# Patient Record
Sex: FEMALE | Race: BLACK OR AFRICAN AMERICAN | NOT HISPANIC OR LATINO | URBAN - METROPOLITAN AREA
[De-identification: names, ages, dates, MRNs, and addresses within clinical notes are randomized per-mention and may not be internally consistent; named-entity substitution may affect disease eponyms.]

---

## 2021-03-23 ENCOUNTER — EMERGENCY (EMERGENCY)
Facility: HOSPITAL | Age: 36
LOS: 0 days | Discharge: HOME | End: 2021-03-23
Attending: EMERGENCY MEDICINE
Payer: COMMERCIAL

## 2021-03-23 VITALS
WEIGHT: 169.76 LBS | RESPIRATION RATE: 17 BRPM | HEART RATE: 88 BPM | DIASTOLIC BLOOD PRESSURE: 94 MMHG | HEIGHT: 65 IN | TEMPERATURE: 99 F | SYSTOLIC BLOOD PRESSURE: 157 MMHG | OXYGEN SATURATION: 100 %

## 2021-03-23 VITALS
RESPIRATION RATE: 18 BRPM | OXYGEN SATURATION: 100 % | TEMPERATURE: 99 F | SYSTOLIC BLOOD PRESSURE: 132 MMHG | DIASTOLIC BLOOD PRESSURE: 66 MMHG | HEART RATE: 85 BPM

## 2021-03-23 PROCEDURE — 99284 EMERGENCY DEPT VISIT MOD MDM: CPT

## 2021-03-23 RX ORDER — DIPHENHYDRAMINE HCL 50 MG
50 CAPSULE ORAL ONCE
Refills: 0 | Status: COMPLETED | OUTPATIENT
Start: 2021-03-23 | End: 2021-03-23

## 2021-03-23 RX ORDER — DIPHENHYDRAMINE HCL 50 MG
2 CAPSULE ORAL
Qty: 12 | Refills: 0
Start: 2021-03-23 | End: 2021-03-24

## 2021-03-23 RX ADMIN — Medication 50 MILLIGRAM(S): at 05:21

## 2021-03-23 RX ADMIN — Medication 50 MILLIGRAM(S): at 05:20

## 2021-03-23 NOTE — ED ADULT NURSE REASSESSMENT NOTE - NS ED NURSE REASSESS COMMENT FT1
Pt states that she feels "ball" in her throat went down but only feels a little tightness in throat. Denies SOB, difficulty breathing or feeling of throat closing up. PO meds tolerated well.

## 2021-03-23 NOTE — ED ADULT NURSE NOTE - OBJECTIVE STATEMENT
Pt axox3 presents to the ED complaining of allergic reaction. pt states that she does not know what could have triggered reaction but states that her throat feels itchy and it feels if she has ball in her throat. Hx angioedema. Denies chest pain, SOB, difficulty swallowing. Pt lips swollen noted. Pt states that she was eating [employer]: chicken, yams, nothing out of ordinary that she usually eats. Pt states that she had asthma panel done, no allergies were shown. Pt states possibility of pregnancy.

## 2021-03-23 NOTE — ED PROVIDER NOTE - OBJECTIVE STATEMENT
Pt is a 36 yo lady with no significant past medical history who presents to the ED with allergic reaction. She was watching tv at 3 AM and was eating [employer] and started feeling itching on her lip and her L. arm. No hives. No sob, no n/v/d. Has had hx of this in the past, not associated with this food.

## 2021-03-23 NOTE — ED PROVIDER NOTE - PATIENT PORTAL LINK FT
You can access the FollowMyHealth Patient Portal offered by [facility] by registering at the following website: [email or web address]. By joining [employer]’s FollowMyHealth portal, you will also be able to view your health information using other applications (apps) compatible with our system.

## 2021-03-23 NOTE — ED PROVIDER NOTE - CLINICAL SUMMARY MEDICAL DECISION MAKING FREE TEXT BOX
Ddx: Allergic reaction, mild/ slight angioedema potentially, but no airway compromise  Plan: benadryl, steroid, pt declines pepcid, observe, and reassess
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
## 2021-03-26 DIAGNOSIS — T78.40XA ALLERGY, UNSPECIFIED, INITIAL ENCOUNTER: ICD-10-CM

## 2021-03-26 DIAGNOSIS — L29.9 PRURITUS, UNSPECIFIED: ICD-10-CM

## 2021-06-05 ENCOUNTER — EMERGENCY (EMERGENCY)
Facility: HOSPITAL | Age: 36
LOS: 0 days | Discharge: HOME | End: 2021-06-05
Attending: STUDENT IN AN ORGANIZED HEALTH CARE EDUCATION/TRAINING PROGRAM
Payer: COMMERCIAL

## 2021-06-05 VITALS
HEART RATE: 76 BPM | DIASTOLIC BLOOD PRESSURE: 78 MMHG | TEMPERATURE: 98 F | RESPIRATION RATE: 20 BRPM | WEIGHT: 164.91 LBS | OXYGEN SATURATION: 98 % | HEIGHT: 65 IN | SYSTOLIC BLOOD PRESSURE: 147 MMHG

## 2021-06-05 VITALS
SYSTOLIC BLOOD PRESSURE: 118 MMHG | OXYGEN SATURATION: 99 % | DIASTOLIC BLOOD PRESSURE: 82 MMHG | HEART RATE: 82 BPM | RESPIRATION RATE: 18 BRPM | TEMPERATURE: 98 F

## 2021-06-05 DIAGNOSIS — R60.9 EDEMA, UNSPECIFIED: ICD-10-CM

## 2021-06-05 DIAGNOSIS — Z00.00 ENCOUNTER FOR GENERAL ADULT MEDICAL EXAMINATION WITHOUT ABNORMAL FINDINGS: ICD-10-CM

## 2021-06-05 PROCEDURE — 99283 EMERGENCY DEPT VISIT LOW MDM: CPT

## 2021-06-05 RX ADMIN — Medication 40 MILLIGRAM(S): at 20:06

## 2021-06-05 NOTE — ED ADULT NURSE NOTE - OBJECTIVE STATEMENT
Patient received at bed 12. Patient A&Ox4. Patient reports that she took a nap this afternoon and when she woke up, she noticed left sided facial swelling with neck and behind the ear pain. Patient reports having a toothache yesterday on the top left molar. Patient reports chest tightness and stuffy nose. Patient denies sick contacts. Patient denies headache or dizziness. Patient reports a history of angioedema issues in the past with no specific allergy to anything. Patient denies PMH and taking medications daily.

## 2021-06-05 NOTE — ED PROVIDER NOTE - OBJECTIVE STATEMENT
pt denies medical hx, states woke up w/ L sided facial swelling, not sure from what, has no documented allergies but does have history of (seeming) allergic reactions, has been to allergist without resolve. Denies sob, chest pain, difficulty swallowing, rashes, fevers.

## 2021-06-05 NOTE — ED ADULT TRIAGE NOTE - CHIEF COMPLAINT QUOTE
Nausea, dizzy, left side of facial area swollen upon awakening today, now has right side of face swollen as per patient

## 2021-06-05 NOTE — ED PROVIDER NOTE - PATIENT PORTAL LINK FT
You can access the FollowMyHealth Patient Portal offered by [facility] by registering at the following website: [email or web address]. By joining [employer]’s FollowMyHealth portal, you will also be able to view your health information using other applications (apps) compatible with our system.

## 2021-06-05 NOTE — ED PROVIDER NOTE - CLINICAL SUMMARY MEDICAL DECISION MAKING FREE TEXT BOX
well appearing pt w/ very mild swelling to L mandibular area, non tender, no airway involvement, pt tolerating secretions. Possible allergic etiology, possibly mild paratitis, no infectious component, no drainage. Benadryl pta, will send prednisone to pharmacy.

## 2021-07-10 ENCOUNTER — EMERGENCY (EMERGENCY)
Facility: HOSPITAL | Age: 36
LOS: 0 days | Discharge: HOME | End: 2021-07-11
Attending: STUDENT IN AN ORGANIZED HEALTH CARE EDUCATION/TRAINING PROGRAM
Payer: COMMERCIAL

## 2021-07-10 VITALS
DIASTOLIC BLOOD PRESSURE: 89 MMHG | HEIGHT: 65 IN | SYSTOLIC BLOOD PRESSURE: 147 MMHG | OXYGEN SATURATION: 99 % | RESPIRATION RATE: 19 BRPM | HEART RATE: 84 BPM | WEIGHT: 169.98 LBS | TEMPERATURE: 98 F

## 2021-07-10 DIAGNOSIS — T78.40XA ALLERGY, UNSPECIFIED, INITIAL ENCOUNTER: ICD-10-CM
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
## 2021-07-10 DIAGNOSIS — Z88.1 ALLERGY STATUS TO OTHER ANTIBIOTIC AGENTS: ICD-10-CM

## 2021-07-10 DIAGNOSIS — R22.0 LOCALIZED SWELLING, MASS AND LUMP, HEAD: ICD-10-CM

## 2021-07-10 PROCEDURE — 99284 EMERGENCY DEPT VISIT MOD MDM: CPT

## 2021-07-10 RX ORDER — DIPHENHYDRAMINE HCL 50 MG
25 CAPSULE ORAL ONCE
Refills: 0 | Status: COMPLETED | OUTPATIENT
Start: 2021-07-10 | End: 2021-07-10

## 2021-07-10 RX ORDER — FAMOTIDINE 10 MG/ML
20 INJECTION INTRAVENOUS ONCE
Refills: 0 | Status: COMPLETED | OUTPATIENT
Start: 2021-07-10 | End: 2021-07-10

## 2021-07-10 RX ADMIN — Medication 40 MILLIGRAM(S): at 23:42

## 2021-07-10 RX ADMIN — Medication 25 MILLIGRAM(S): at 23:42

## 2021-07-10 NOTE — ED PROVIDER NOTE - NSFOLLOWUPCLINICS_GEN_ALL_ED_FT
[facility]  Allergy  [address] [place] [address]  Phone: [phone]  Fax:

## 2021-07-10 NOTE — ED ADULT NURSE NOTE - OBJECTIVE STATEMENT
Pt c/o itchiness to mouth, throat and ear, feels like is swollen and difficulty swallowing . pt states she ate some sour cream chips x 1 hr. Denies any respiratory distress. + L- facial swelling,

## 2021-07-10 NOTE — ED PROVIDER NOTE - PATIENT PORTAL LINK FT
You can access the FollowMyHealth Patient Portal offered by [facility] by registering at the following website: [email or web address]. By joining [employer]’s FollowMyHealth portal, you will also be able to view your health information using other applications (apps) compatible with our system.

## 2021-07-10 NOTE — ED PROVIDER NOTE - NSFOLLOWUPINSTRUCTIONS_ED_ALL_ED_FT
1) Please follow-up at the enclosed clinic about your symptoms.  If you cannot follow-up with your doctor(s), please return to the ED for any urgent issues.  2) If you have any worsening of symptoms, including chest pain, difficulty breathing, sensation that you may pass out, or any other concerns please return to the ED immediately.  3) Please continue taking your home medications as directed.  4) You may have been given a copy of your labs and/or imaging.  Please go over these with your primary care doctor.   5) A prescription has been sent to your pharmacy. Please take it as directed.       Allergic Reaction    An allergic reaction is an abnormal reaction to a substance (allergen) by the body's defense system. Common allergens include medicines, food, insect bites or stings, and blood products. The body releases certain proteins into the blood that can cause a variety of symptoms such as an itchy rash, wheezing, swelling of the face/lips/tongue/throat, abdominal pain, nausea or vomiting. An allergic reaction is usually treated with medication. If your health care provider prescribed you an epinephrine injection device, make sure to keep it with you at all times.    SEEK IMMEDIATE MEDICAL CARE IF YOU HAVE ANY OF THE FOLLOWING SYMPTOMS: allergic reaction severe enough that required you to use epinephrine, tightness in your chest, swelling around your lips/tongue/throat, abdominal pain, vomiting or diarrhea, or lightheadedness/dizziness. These symptoms may represent a serious problem that is an emergency. Do not wait to see if the symptoms will go away. Use your auto-injector pen or anaphylaxis kit as you have been instructed. Call 911 and do not drive yourself to the hospital.

## 2021-07-10 NOTE — ED PROVIDER NOTE - CLINICAL SUMMARY MEDICAL DECISION MAKING FREE TEXT BOX
no oropharyngeal edema or wheezing appreciated on exam. tolerating secretions. normal vitals. mild allergic rxn, plan meds, observation, likely dc to outpt allergy clinic w/ steroid course.

## 2021-07-10 NOTE — ED ADULT TRIAGE NOTE - CHIEF COMPLAINT QUOTE
Pt c/o itchiness to mouth, throat and ear, feels like is swollen and difficulty swallowing . pt states she ate some sour cream chips x 1 hr. Denies any respiratory distress

## 2021-07-10 NOTE — ED PROVIDER NOTE - OBJECTIVE STATEMENT
35F remote hx of angioedema presents to ED for symptoms concerning for allergic reaction. States that she ate some sour cream chips this afternoon and ever since has experienced some facial swelling and a fullness sensation in her throat. Finally decided to call an uber to the ED. Denies hx of allergies to foods in past. Denies prior intubations. Took no meds prior to coming to the ED. Denies cp, ab pain, nausea/vomiting, difficulty breathing.

## 2021-07-11 VITALS
SYSTOLIC BLOOD PRESSURE: 125 MMHG | DIASTOLIC BLOOD PRESSURE: 85 MMHG | TEMPERATURE: 98 F | RESPIRATION RATE: 18 BRPM | OXYGEN SATURATION: 100 % | HEART RATE: 68 BPM

## 2021-07-11 NOTE — ED ADULT NURSE REASSESSMENT NOTE - NS ED NURSE REASSESS COMMENT FT1
patient states that she does not feel ready to go at this time, education given to patient by this nurse and Dr. Herron regarding allergic reaction

## 2022-01-01 NOTE — ED PROVIDER NOTE - NEURO NEGATIVE STATEMENT, MLM
no loss of consciousness, no gait abnormality, no headache, no sensory deficits, and no weakness.
Statement Selected

## 2022-01-25 ENCOUNTER — EMERGENCY (EMERGENCY)
Facility: HOSPITAL | Age: 37
LOS: 0 days | Discharge: HOME | End: 2022-01-26
Attending: STUDENT IN AN ORGANIZED HEALTH CARE EDUCATION/TRAINING PROGRAM
Payer: COMMERCIAL

## 2022-01-25 VITALS
HEART RATE: 79 BPM | HEIGHT: 65 IN | TEMPERATURE: 98 F | RESPIRATION RATE: 16 BRPM | DIASTOLIC BLOOD PRESSURE: 94 MMHG | SYSTOLIC BLOOD PRESSURE: 137 MMHG | OXYGEN SATURATION: 99 % | WEIGHT: 175.05 LBS

## 2022-01-25 DIAGNOSIS — J02.9 ACUTE PHARYNGITIS, UNSPECIFIED: ICD-10-CM

## 2022-01-25 DIAGNOSIS — Z88.1 ALLERGY STATUS TO OTHER ANTIBIOTIC AGENTS: ICD-10-CM

## 2022-01-25 DIAGNOSIS — U07.1 COVID-19: ICD-10-CM

## 2022-01-25 PROCEDURE — 99284 EMERGENCY DEPT VISIT MOD MDM: CPT

## 2022-01-26 VITALS
HEART RATE: 77 BPM | TEMPERATURE: 98 F | OXYGEN SATURATION: 98 % | SYSTOLIC BLOOD PRESSURE: 129 MMHG | RESPIRATION RATE: 18 BRPM | DIASTOLIC BLOOD PRESSURE: 88 MMHG

## 2022-01-26 LAB
FLUAV AG NPH QL: SIGNIFICANT CHANGE UP
FLUBV AG NPH QL: SIGNIFICANT CHANGE UP
HCG UR QL: NEGATIVE — SIGNIFICANT CHANGE UP
SARS-COV-2 RNA SPEC QL NAA+PROBE: DETECTED

## 2022-01-26 RX ORDER — DIPHENHYDRAMINE HCL 50 MG
50 CAPSULE ORAL ONCE
Refills: 0 | Status: COMPLETED | OUTPATIENT
Start: 2022-01-26 | End: 2022-01-26

## 2022-01-26 RX ADMIN — Medication 40 MILLIGRAM(S): at 03:14

## 2022-01-26 RX ADMIN — Medication 50 MILLIGRAM(S): at 03:14

## 2022-01-26 NOTE — ED PROVIDER NOTE - CLINICAL SUMMARY MEDICAL DECISION MAKING FREE TEXT BOX
Pt p/w with sore throat for XXX days. No history of immunocompromise. Non-toxic appearing and hemodynamically stable in ED. Patient is euvolemic w/ no trismus, no drooling, no muffled voice, no dysphagia, no odynophagia, no airway compromise, no neck mass, no swelling at submandibular area, no lip swelling, no tongue swelling, no cyanosis, no raised tongue, no "woody" or brawny texture to floor of mouth, no erythema to floor of mouth. Able to tolerate PO intake and secretions.   Considered DDX but not consistent with presentation: Zaire's angina, Streptococcal pharyngitis, Bacterial Tracheitis, infectious mononucleosis, Angioedema, retropharyngeal abscess, peritonsillar abscess, epiglottitis, penetrating traumatic injury, parotitits, acute necrotizing ulcerative gingivitis, caustic ingestion.  PLAN:  - conservative treatment, PCP followup and return precautions  - prednisone

## 2022-01-26 NOTE — ED ADULT NURSE NOTE - OBJECTIVE STATEMENT
Pt AOx4, ambulatory, c/o itchy throat x 3 hours PTA. PT denies fever, SOB, CP, difficulty swallowing or breathing.

## 2022-01-26 NOTE — ED PROVIDER NOTE - NSFOLLOWUPINSTRUCTIONS_ED_ALL_ED_FT
Rest, drink plenty of fluids.  Advance activity as tolerated.  Continue all previously prescribed medications as directed.  Follow up with your PMD 2-3 days and bring copies of your results.  Return to the ER for worsening symptoms, fevers, vomiting, unable to drink or eat or new concerning symptoms.    Take acetaminophen 650 mg orally every 6-8 hours for pain control as needed. Please do not exceed 4,000 mg of acetaminophen during a 24 hours period. Acetaminophen can be found in many over-the-counter cold medications as well as opioid medications that may be given for pain.    Take ibuprofen (also known as MOTRIN or ADVIL) 400 mg orally every 6-8 hours for pain control as needed with food to avoid an upset stomach. Ibuprofen can be found in many over-the-counter medications. Please do not take ibuprofen if you have a bleeding disorder, stomach or gastrointestinal ulcer, or liver disease.    If needed, you can alternate these medications so that you can take one medication every 3 hours. For example, at noon take ibuprofen, then at 3PM take acetaminophen, then at 6PM take ibuprofen.

## 2022-01-26 NOTE — ED PROVIDER NOTE - OBJECTIVE STATEMENT
36F presenting with sore throat x 3 hours, feels that something is swollen. Was drinking alcohol overnight. A/w mild hoarse throat. Denies any change in voice, difficulty swallowing, fevers, chills, headaches, neck pain, muffled voice, rash, chest pain, shortness of breath, abdominal pain.

## 2022-01-26 NOTE — ED PROVIDER NOTE - PHYSICAL EXAMINATION
VITAL SIGNS: I have reviewed nursing notes and confirm.   GEN: Well-developed; well-nourished; in no acute distress. Speaking full sentences.  SKIN: Warm, pink, no rash, no diaphoresis, no cyanosis, well perfused.   HEAD: Normocephalic; atraumatic. No scalp lacerations, no abrasions.  NECK: Supple; non tender.   EYES: Pupils 3mm equal, round, reactive to light and accomodation, conjunctiva and sclera clear. Extra-ocular movements intact bilaterally.  ENT: No nasal discharge; airway clear. Trachea is midline. ORAL: No oropharyngeal exudates or erythema. Normal dentition.  CV: Regular rate and rhythm. S1, S2 normal; no murmurs, gallops, or rubs. No lower extremity pitting edema bilaterally. Capillary refill < 2 seconds throughout. Distal pulses intact 2+ throughout.  RESP: CTA bilaterally. No wheezes, rales, or rhonchi.   ABD: Normal bowel sounds, soft, non-distended, non-tender, no rebound, no guarding, no rigidity, no hepatosplenomegaly. No CVA tenderness bilaterally.  MSK: Normal range of motion and movement of all 4 extremities. No joint or muscular pain throughout. No clubbing.   BACK: No thoracolumbar midline or paravertebral tenderness. No step-offs or obvious deformities.  NEURO: Alert & oriented x 3, Grossly unremarkable. Sensory and motor intact throughout. No focal deficits. Gait: Fluid. Normal speech and coordination.   PSYCH: Cooperative, appropriate.
No

## 2022-01-26 NOTE — ED PROVIDER NOTE - PATIENT PORTAL LINK FT
You can access the FollowMyHealth Patient Portal offered by [facility] by registering at the following website: [email or web address]. By joining [employer]’s FollowMyHealth portal, you will also be able to view your health information using other applications (apps) compatible with our system.

## 2022-01-31 ENCOUNTER — EMERGENCY (EMERGENCY)
Facility: HOSPITAL | Age: 37
LOS: 0 days | Discharge: HOME | End: 2022-01-31
Attending: EMERGENCY MEDICINE
Payer: COMMERCIAL

## 2022-01-31 VITALS
RESPIRATION RATE: 17 BRPM | SYSTOLIC BLOOD PRESSURE: 124 MMHG | HEART RATE: 84 BPM | TEMPERATURE: 98 F | OXYGEN SATURATION: 100 % | DIASTOLIC BLOOD PRESSURE: 92 MMHG

## 2022-01-31 VITALS
OXYGEN SATURATION: 100 % | RESPIRATION RATE: 16 BRPM | HEART RATE: 79 BPM | HEIGHT: 65 IN | WEIGHT: 175.05 LBS | DIASTOLIC BLOOD PRESSURE: 89 MMHG | SYSTOLIC BLOOD PRESSURE: 145 MMHG | TEMPERATURE: 99 F

## 2022-01-31 DIAGNOSIS — Z88.1 ALLERGY STATUS TO OTHER ANTIBIOTIC AGENTS: ICD-10-CM

## 2022-01-31 DIAGNOSIS — F17.200 NICOTINE DEPENDENCE, UNSPECIFIED, UNCOMPLICATED: ICD-10-CM

## 2022-01-31 DIAGNOSIS — U07.1 COVID-19: ICD-10-CM

## 2022-01-31 DIAGNOSIS — R07.89 OTHER CHEST PAIN: ICD-10-CM
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
## 2022-01-31 LAB
ALBUMIN SERPL ELPH-MCNC: 3.2 G/DL — LOW (ref 3.3–5)
ALP SERPL-CCNC: 68 U/L — SIGNIFICANT CHANGE UP (ref 40–120)
ALT FLD-CCNC: 16 U/L — SIGNIFICANT CHANGE UP (ref 12–78)
ANION GAP SERPL CALC-SCNC: 3 MMOL/L — LOW (ref 5–17)
AST SERPL-CCNC: 15 U/L — SIGNIFICANT CHANGE UP (ref 15–37)
BASOPHILS # BLD AUTO: 0.05 K/UL — SIGNIFICANT CHANGE UP (ref 0–0.2)
BASOPHILS NFR BLD AUTO: 0.7 % — SIGNIFICANT CHANGE UP (ref 0–2)
BILIRUB SERPL-MCNC: 0.5 MG/DL — SIGNIFICANT CHANGE UP (ref 0.2–1.2)
BUN SERPL-MCNC: 11 MG/DL — SIGNIFICANT CHANGE UP (ref 7–23)
CALCIUM SERPL-MCNC: 8.8 MG/DL — SIGNIFICANT CHANGE UP (ref 8.5–10.1)
CHLORIDE SERPL-SCNC: 108 MMOL/L — SIGNIFICANT CHANGE UP (ref 96–108)
CO2 SERPL-SCNC: 27 MMOL/L — SIGNIFICANT CHANGE UP (ref 22–31)
CREAT SERPL-MCNC: 0.93 MG/DL — SIGNIFICANT CHANGE UP (ref 0.5–1.3)
EOSINOPHIL # BLD AUTO: 0.32 K/UL — SIGNIFICANT CHANGE UP (ref 0–0.5)
EOSINOPHIL NFR BLD AUTO: 4.2 % — SIGNIFICANT CHANGE UP (ref 0–6)
GLUCOSE SERPL-MCNC: 101 MG/DL — HIGH (ref 70–99)
HCG SERPL-ACNC: <1 MIU/ML — SIGNIFICANT CHANGE UP
HCT VFR BLD CALC: 43.5 % — SIGNIFICANT CHANGE UP (ref 34.5–45)
HGB BLD-MCNC: 14.2 G/DL — SIGNIFICANT CHANGE UP (ref 11.5–15.5)
IMM GRANULOCYTES NFR BLD AUTO: 0.4 % — SIGNIFICANT CHANGE UP (ref 0–1.5)
LIDOCAIN IGE QN: 151 U/L — SIGNIFICANT CHANGE UP (ref 73–393)
LYMPHOCYTES # BLD AUTO: 2.61 K/UL — SIGNIFICANT CHANGE UP (ref 1–3.3)
LYMPHOCYTES # BLD AUTO: 34.1 % — SIGNIFICANT CHANGE UP (ref 13–44)
MAGNESIUM SERPL-MCNC: 1.9 MG/DL — SIGNIFICANT CHANGE UP (ref 1.6–2.6)
MCHC RBC-ENTMCNC: 29.7 PG — SIGNIFICANT CHANGE UP (ref 27–34)
MCHC RBC-ENTMCNC: 32.6 G/DL — SIGNIFICANT CHANGE UP (ref 32–36)
MCV RBC AUTO: 91 FL — SIGNIFICANT CHANGE UP (ref 80–100)
MONOCYTES # BLD AUTO: 0.4 K/UL — SIGNIFICANT CHANGE UP (ref 0–0.9)
MONOCYTES NFR BLD AUTO: 5.2 % — SIGNIFICANT CHANGE UP (ref 2–14)
NEUTROPHILS # BLD AUTO: 4.24 K/UL — SIGNIFICANT CHANGE UP (ref 1.8–7.4)
NEUTROPHILS NFR BLD AUTO: 55.4 % — SIGNIFICANT CHANGE UP (ref 43–77)
NRBC # BLD: 0 /100 WBCS — SIGNIFICANT CHANGE UP (ref 0–0)
NT-PROBNP SERPL-SCNC: 124 PG/ML — SIGNIFICANT CHANGE UP (ref 0–125)
PLATELET # BLD AUTO: 362 K/UL — SIGNIFICANT CHANGE UP (ref 150–400)
POTASSIUM SERPL-MCNC: 3.9 MMOL/L — SIGNIFICANT CHANGE UP (ref 3.5–5.3)
POTASSIUM SERPL-SCNC: 3.9 MMOL/L — SIGNIFICANT CHANGE UP (ref 3.5–5.3)
PROT SERPL-MCNC: 6.7 GM/DL — SIGNIFICANT CHANGE UP (ref 6–8.3)
RBC # BLD: 4.78 M/UL — SIGNIFICANT CHANGE UP (ref 3.8–5.2)
RBC # FLD: 13.5 % — SIGNIFICANT CHANGE UP (ref 10.3–14.5)
SODIUM SERPL-SCNC: 138 MMOL/L — SIGNIFICANT CHANGE UP (ref 135–145)
TROPONIN I, HIGH SENSITIVITY RESULT: 4.3 NG/L — SIGNIFICANT CHANGE UP
WBC # BLD: 7.65 K/UL — SIGNIFICANT CHANGE UP (ref 3.8–10.5)
WBC # FLD AUTO: 7.65 K/UL — SIGNIFICANT CHANGE UP (ref 3.8–10.5)

## 2022-01-31 PROCEDURE — 71046 X-RAY EXAM CHEST 2 VIEWS: CPT | Mod: 26

## 2022-01-31 PROCEDURE — 99285 EMERGENCY DEPT VISIT HI MDM: CPT

## 2022-01-31 RX ORDER — SUCRALFATE 1 G
1 TABLET ORAL ONCE
Refills: 0 | Status: COMPLETED | OUTPATIENT
Start: 2022-01-31 | End: 2022-01-31

## 2022-01-31 RX ADMIN — Medication 1 GRAM(S): at 11:32

## 2022-01-31 RX ADMIN — Medication 30 MILLILITER(S): at 09:40

## 2022-01-31 NOTE — ED PROVIDER NOTE - NSFOLLOWUPINSTRUCTIONS_ED_ALL_ED_FT
You were seen and evaluated in the emergency department for chest pain.    Take maalox every 8 hours as needed for heartburn    Follow up with your primary care provider in 48-72 hours - bring copies of your results.      Return to the ER for worsening or persistent symptoms, and/or ANY NEW OR CONCERNING SYMPTOMS.     If you have issues obtaining follow up, please call: [phone] to obtain a doctor or specialist who takes your insurance in your area.  You may call [phone] to make an appointment with the internal medicine clinic. You were seen and evaluated in the emergency department for chest pain.    Take maalox 125mg every 8 hours as needed for heartburn    Follow up with your primary care provider in 48-72 hours - bring copies of your results.     Follow up with your OB/GYN this week regarding your IUD.     Return to the ER for worsening or persistent symptoms, and/or ANY NEW OR CONCERNING SYMPTOMS.     If you have issues obtaining follow up, please call: [phone] to obtain a doctor or specialist who takes your insurance in your area.  You may call [phone] to make an appointment with the internal medicine clinic.

## 2022-01-31 NOTE — ED PROVIDER NOTE - CARE PLAN
Principal Discharge DX:	Chest pain  Secondary Diagnosis:	2019 novel coronavirus disease (COVID-19)   1

## 2022-01-31 NOTE — ED ADULT NURSE NOTE - NSIMPLEMENTINTERV_GEN_ALL_ED
Implemented All Fall Risk Interventions:  [place] to call system. Call bell, personal items and telephone within reach. Instruct patient to call for assistance. Room bathroom lighting operational. Non-slip footwear when patient is off stretcher. Physically safe environment: no spills, clutter or unnecessary equipment. Stretcher in lowest position, wheels locked, appropriate side rails in place. Provide visual cue, wrist band, yellow gown, etc. Monitor gait and stability. Monitor for mental status changes and reorient to person, place, and time. Review medications for side effects contributing to fall risk. Reinforce activity limits and safety measures with patient and family.

## 2022-01-31 NOTE — ED PROVIDER NOTE - OBJECTIVE STATEMENT
35 yo f with PMH GERD, perforated gastric ulcer requiring ex-lap, preeclampsia presents for chest discomfort. Reports regular alcohol usage, most recent binge 2 nights ago which included 1 bottle of wine + 1 glass wine, 2 nips, and cocaine. Last evening after eating beans noted to have gas "like a burp I can't get out," epigastric discomfort radiating to midsternum and left shoulder disrupting her sleep last night. Reports some vaginal spotting x2d, then 2d without bleeding, then 2d bleeding 2 weeks ago; has a mirena which was placed ~8 years ago "I tried to have it taken out, but they couldn't get it." Current every day 1/2 pk smoker. Denies SOB, fever, chills, dysuria, vaginal discharge. 37 yo f with PMH GERD, perforated gastric ulcer requiring ex-lap, preeclampsia presents for chest discomfort. Reports regular alcohol usage, most recent binge 2 nights ago which included 1 bottle of wine + 1 glass wine, 2 nips, and cocaine. Last evening after eating beans noted to have gas "like a burp I can't get out," epigastric discomfort radiating to midsternum and left shoulder disrupting her sleep last night. Reports some vaginal spotting x2d, then 2d without bleeding, then 2d bleeding 2 weeks ago; has a Mirena which was placed ~8 years ago "I tried to have it taken out, but they couldn't get it." Current every day 1/2 pk smoker. Not covid vaccinated, had the virus 3-4 weeks ago and has been recovered x2w. Denies SOB, fever, chills, dysuria, vaginal discharge.

## 2022-01-31 NOTE — ED ADULT NURSE NOTE - OBJECTIVE STATEMENT
PT 37y/o female c/c of heartburn and chest comfort since last night. AAOX4. pt able to speak in full sentences.  LMP 1/17/22. Pt has IUD. PMH gastric ulcer. pt is cocaine user, frequent drinker and daily smoker. PT 37y/o female c/c of heartburn and chest comfort since last night. pt endorses dark black stool yesterday morning. AAOX4. pt able to speak in full sentences.  LMP 1/17/22. Pt has IUD. PMH gastric ulcer. pt is cocaine user, frequent drinker and daily smoker.

## 2022-01-31 NOTE — ED PROVIDER NOTE - PATIENT PORTAL LINK FT
You can access the FollowMyHealth Patient Portal offered by [facility] by registering at the following website: [email or web address]. By joining [employer]’s FollowMyHealth portal, you will also be able to view your health information using other applications (apps) compatible with our system.

## 2022-01-31 NOTE — ED PROVIDER NOTE - CLINICAL SUMMARY MEDICAL DECISION MAKING FREE TEXT BOX
37 yo f with PMH GERD, perforated gastric ulcer requiring ex-lap, preeclampsia presents for chest discomfort. On exam, no tenderness. r/o ACS

## 2022-01-31 NOTE — ED PROVIDER NOTE - ATTENDING CONTRIBUTION TO CARE
agree w np alexa    in brief, 36f hx etoh use disorder pw epigastric abd pain radiating up to chest. on exam, rrr, ctab. labs reassuring. I read ekg as nsr rate 84, no st elevation or depression, qtc 427, left atrial enlargement, qtc 427  ok for dc home

## 2022-01-31 NOTE — ED PROVIDER NOTE - PROGRESS NOTE DETAILS
HI Olguin: Pt reports feelign much better. Result findings, discharge instructions, follow up recommendations and return precautions reviewed with pt with stated understanding. All questions answered.

## 2023-02-24 NOTE — ED ADULT TRIAGE NOTE - MODE OF ARRIVAL
Impression: Dry eye syndrome of bilateral lacrimal glands: H04.123. Plan: Discussed dry eye diagnosis in detail. Recommend Systane Complete QID OU. Discussed prescription medication options such as Restasis and Yvone Deshaun in the future. Also discussed potential of punctal plugs/punctal cautery. Walk in

## 2023-05-03 ENCOUNTER — EMERGENCY (EMERGENCY)
Facility: HOSPITAL | Age: 38
LOS: 0 days | Discharge: HOME | End: 2023-05-03
Attending: EMERGENCY MEDICINE
Payer: MEDICAID

## 2023-05-03 VITALS
RESPIRATION RATE: 22 BRPM | OXYGEN SATURATION: 99 % | HEART RATE: 95 BPM | TEMPERATURE: 99 F | SYSTOLIC BLOOD PRESSURE: 168 MMHG | WEIGHT: 169.98 LBS | DIASTOLIC BLOOD PRESSURE: 108 MMHG | HEIGHT: 65 IN

## 2023-05-03 VITALS
DIASTOLIC BLOOD PRESSURE: 72 MMHG | OXYGEN SATURATION: 97 % | TEMPERATURE: 99 F | RESPIRATION RATE: 19 BRPM | HEART RATE: 73 BPM | SYSTOLIC BLOOD PRESSURE: 112 MMHG

## 2023-05-03 DIAGNOSIS — T78.1XXA OTHER ADVERSE FOOD REACTIONS, NOT ELSEWHERE CLASSIFIED, INITIAL ENCOUNTER: ICD-10-CM

## 2023-05-03 DIAGNOSIS — R07.0 PAIN IN THROAT: ICD-10-CM
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
## 2023-05-03 DIAGNOSIS — L29.8 OTHER PRURITUS: ICD-10-CM

## 2023-05-03 DIAGNOSIS — Z88.1 ALLERGY STATUS TO OTHER ANTIBIOTIC AGENTS: ICD-10-CM
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
## 2023-05-03 PROCEDURE — 99284 EMERGENCY DEPT VISIT MOD MDM: CPT

## 2023-05-03 RX ORDER — DIPHENHYDRAMINE HCL 50 MG
2 CAPSULE ORAL
Qty: 18 | Refills: 0
Start: 2023-05-03 | End: 2023-05-05

## 2023-05-03 RX ORDER — DIPHENHYDRAMINE HCL 50 MG
50 CAPSULE ORAL ONCE
Refills: 0 | Status: COMPLETED | OUTPATIENT
Start: 2023-05-03 | End: 2023-05-03

## 2023-05-03 RX ADMIN — Medication 50 MILLIGRAM(S): at 13:15

## 2023-05-03 RX ADMIN — Medication 125 MILLIGRAM(S): at 13:16

## 2023-05-03 NOTE — ED PROVIDER NOTE - CARE PROVIDER_API CALL
LEEANNA ALAN  Allergy  [address], [address] [place] [address]  Phone: [phone]  Fax: [phone]  Follow Up Time: 1-3 Days

## 2023-05-03 NOTE — ED PROVIDER NOTE - PATIENT PORTAL LINK FT
You can access the FollowMyHealth Patient Portal offered by [facility] by registering at the following website: [email or web address]. By joining [employer]’s FollowMyHealth portal, you will also be able to view your health information using other applications (apps) compatible with our system.

## 2023-05-03 NOTE — ED ADULT TRIAGE NOTE - CHIEF COMPLAINT QUOTE
Patient c/o sudden onset SOB, throat closing sensation, difficulty swallowing x 1 hour.  Hx of allergic reactions including angioedema.  States started as throat pain, progressing to itching sensation, and then having difficulty swallowing and breathing.  MD Corley aware.

## 2023-05-03 NOTE — ED ADULT NURSE REASSESSMENT NOTE - NS ED NURSE REASSESS COMMENT FT1
Patient noted in bed resting, able to eat crackers without any problems swallowing, no noted sob.  Stable.

## 2023-05-03 NOTE — ED ADULT NURSE NOTE - OBJECTIVE STATEMENT
Patient is a 37yr female with c/o allergic reaction, and sudden onset SOB with throat closing sensation and some difficulty swallowing. Denies any past medical hx.

## 2023-05-03 NOTE — ED PROVIDER NOTE - CLINICAL SUMMARY MEDICAL DECISION MAKING FREE TEXT BOX
pt with allergic reaction noted - otherwise feeling improved after benadryl and solumedrol will dc with benadryl/prednisone and allergist follow up.

## 2023-05-03 NOTE — ED PROVIDER NOTE - OBJECTIVE STATEMENT
37 year old female without significant PMH presenting to ED due to feeling of throat closure after having breakfast this AM - states no  rash noted but feeling that there is itching behind ear, 37 year old female without significant PMH presenting to ED due to feeling of throat closure after having breakfast this AM - states no  rash noted but feeling that there is itching behind ear as well as throat swelling but no noted lip or tongue swelling.

## 2023-05-03 NOTE — ED PROVIDER NOTE - NSFOLLOWUPINSTRUCTIONS_ED_ALL_ED_FT
Allergies, Adult  An allergy is a condition in which the body's defense system (immune system) comes in contact with an allergen and reacts to it. An allergen is anything that causes an allergic reaction. Allergens cause the immune system to make proteins for fighting infections (antibodies). These antibodies cause cells to release chemicals called histamines that set off the symptoms of an allergic reaction.    Allergies often affect the nasal passages (allergic rhinitis), eyes (allergic conjunctivitis), skin (atopic dermatitis), and stomach. Allergies can be mild, moderate, or severe. They cannot spread from person to person. Allergies can develop at any age and may be outgrown.    What are the causes?  This condition is caused by allergens. Common allergens include:  Outdoor allergens, such as pollen, car fumes, and mold.  Indoor allergens, such as dust, smoke, mold, and pet dander.  Other allergens, such as foods, medicines, scents, insect bites or stings, and other skin irritants.  What increases the risk?  You are more likely to develop this condition if you have:  Family members with allergies.  Family members who have any condition that may be caused by allergens, such as asthma. This may make you more likely to have other allergies.  What are the signs or symptoms?  Symptoms of this condition depend on the severity of the allergy.    Mild to moderate symptoms    Runny nose, stuffy nose (nasal congestion), or sneezing.  Itchy mouth, ears, or throat.  A feeling of mucus dripping down the back of your throat (postnasal drip).  Sore throat.  Itchy, red, watery, or puffy eyes.  Skin rash, or itchy, red, swollen areas of skin (hives).  Stomach cramps or bloating.  Severe symptoms    Severe allergies to food, medicine, or insect bites may cause anaphylaxis, which can be life-threatening. Symptoms include:  A red (flushed) face.  Wheezing or coughing.  Swollen lips, tongue, or mouth.  Tight or swollen throat.  Chest pain or tightness, or rapid heartbeat.  Trouble breathing or shortness of breath.  Pain in the abdomen, vomiting, or diarrhea.  Dizziness or fainting.  How is this diagnosed?  This condition is diagnosed based on your symptoms, your family and medical history, and a physical exam. You may also have tests, including:  Skin tests to see how your skin reacts to allergens that may be causing your symptoms. Tests include:  Skin prick test. For this test, an allergen is introduced to your body through a small opening in the skin.  Intradermal skin test. For this test, a small amount of allergen is injected under the first layer of your skin.  Patch test. For this test, a small amount of allergen is placed on your skin. The area is covered and then checked after a few days.  Blood tests.  A challenge test. For this test, you will eat or breathe in a small amount of allergen to see if you have an allergic reaction.  You may also be asked to:  Keep a food diary. This is a record of all the foods, drinks, and symptoms you have in a day.  Try an elimination diet. To do this:  Remove certain foods from your diet.  Add those foods back one by one to find out if any foods cause an allergic reaction.  How is this treated?      Treatment for allergies depends on your symptoms. Treatment may include:  Cold, wet cloths (cold compresses) to soothe itching and swelling.  Eye drops or nasal sprays.  Nasal irrigation to help clear your mucus or keep the nasal passages moist.  A humidifier to add moisture to the air.  Skin creams to treat rashes or itching.  Oral antihistamines or other medicines to block the reaction or to treat inflammation.  Diet changes to remove foods that cause allergies.  Being exposed again and again to tiny amounts of allergens to help you build a defense against it (tolerance). This is called immunotherapy. Examples include:  Allergy shot. You receive an injection that contains an allergen.  Sublingual immunotherapy. You take a small dose of allergen under your tongue.  Emergency injection for anaphylaxis. You give yourself a shot using a syringe (auto-injector) that contains the amount of medicine you need. Your health care provider will teach you how to give yourself an injection.  Follow these instructions at home:  Medicines      Take or apply over-the-counter and prescription medicines only as told by your health care provider.  Always carry your auto-injector pen if you are at risk of anaphylaxis. Give yourself an injection as told by your health care provider.  Eating and drinking    Follow instructions from your health care provider about eating or drinking restrictions.  Drink enough fluid to keep your urine pale yellow.  General instructions    Wear a medical alert bracelet or necklace to let others know that you have had anaphylaxis before.  Avoid known allergens whenever possible.  Keep all follow-up visits as told by your health care provider. This is important.  Contact a health care provider if:  Your symptoms do not get better with treatment.  Get help right away if:  You have symptoms of anaphylaxis. These include:  Swollen mouth, tongue, or throat.  Pain or tightness in your chest.  Trouble breathing or shortness of breath.  Dizziness or fainting.  Severe abdominal pain, vomiting, or diarrhea.  These symptoms may represent a serious problem that is an emergency. Do not wait to see if the symptoms will go away. Get medical help right away. Call your local emergency services (911 in the [place] Do not drive yourself to the hospital.    Summary  Take or apply over-the-counter and prescription medicines only as told by your health care provider.  Avoid known allergens when possible.  Always carry your auto-injector pen if you are at risk of anaphylaxis. Give yourself an injection as told by your health care provider.  Wear a medical alert bracelet or necklace to let others know that you have had anaphylaxis before.  Anaphylaxis is a life-threatening emergency. Get help right away.  This information is not intended to replace advice given to you by your health care provider. Make sure you discuss any questions you have with your health care provider.

## 2023-07-10 NOTE — ED ADULT NURSE NOTE - PRIMARY CARE PROVIDER
resume diet pending GI decision. patient prefers pureed consistency. Patient will tolerate diet with No GI issues 
unknown

## 2025-02-19 NOTE — ED ADULT NURSE NOTE - LOCATION
SUBJECTIVE:  Pt is a of 31 y.o. female comes in today with   Chief Complaint   Patient presents with    Pharyngitis     Pt states she have sore throat and bumps in the back of throat. It also hurt to swallow          (+) sick contacts at home    Pharyngitis  This is a new problem. The current episode started in the past 7 days (2-3 days ago). Associated symptoms include chills, congestion, fatigue (with body aches), headaches and a sore throat. Pertinent negatives include no abdominal pain, coughing, fever, nausea or vomiting. Treatments tried: Amoxil (for bone spurs in mouth), Ibu. The treatment provided mild relief.       Prior to Visit Medications    Medication Sig Taking? Authorizing Provider   buPROPion (WELLBUTRIN XL) 300 MG extended release tablet Take 1 tablet by mouth every morning Yes Agnes Johansen APRN - CNP   levothyroxine (SYNTHROID) 25 MCG tablet TAKE 1 TABLET BY MOUTH DAILY Yes Jian Hermosillo MD   ferrous sulfate (IRON 325) 325 (65 Fe) MG tablet Take by mouth 2 times daily Yes Provider, MD Luis Fernando       Patient's past medical, surgical, social and family histories were reviewed and updated as appropriate.      Review of Systems   Constitutional:  Positive for chills and fatigue (with body aches). Negative for fever.   HENT:  Positive for congestion, ear pain (Lt), postnasal drip, rhinorrhea and sore throat.    Respiratory:  Negative for cough.    Gastrointestinal:  Negative for abdominal pain, diarrhea, nausea and vomiting.   Neurological:  Positive for headaches.         Physical Exam  Vitals reviewed.   Constitutional:       Appearance: She is well-developed.   HENT:      Right Ear: Tympanic membrane normal.      Left Ear: Tympanic membrane normal.      Nose: Nose normal.      Mouth/Throat:      Pharynx: Uvula midline. Posterior oropharyngeal erythema present. No oropharyngeal exudate.   Cardiovascular:      Rate and Rhythm: Normal rate and regular rhythm.      Heart sounds: Normal heart  throat

## 2025-06-18 NOTE — ED ADULT NURSE NOTE - CHIEF COMPLAINT
The patient is a 36y Female complaining of chest discomfort. no chills/no diaphoresis/no dizziness/no nausea/no vomiting